# Patient Record
Sex: MALE | Race: WHITE
[De-identification: names, ages, dates, MRNs, and addresses within clinical notes are randomized per-mention and may not be internally consistent; named-entity substitution may affect disease eponyms.]

---

## 2017-07-24 ENCOUNTER — HOSPITAL ENCOUNTER (EMERGENCY)
Dept: HOSPITAL 45 - C.EDB | Age: 52
Discharge: HOME | End: 2017-07-24
Payer: COMMERCIAL

## 2017-07-24 VITALS
WEIGHT: 200.62 LBS | HEIGHT: 67.01 IN | HEIGHT: 67.01 IN | BODY MASS INDEX: 31.49 KG/M2 | WEIGHT: 200.62 LBS | BODY MASS INDEX: 31.49 KG/M2

## 2017-07-24 VITALS — SYSTOLIC BLOOD PRESSURE: 127 MMHG | HEART RATE: 70 BPM | DIASTOLIC BLOOD PRESSURE: 70 MMHG | OXYGEN SATURATION: 98 %

## 2017-07-24 VITALS — TEMPERATURE: 97.88 F

## 2017-07-24 DIAGNOSIS — M50.322: ICD-10-CM

## 2017-07-24 DIAGNOSIS — M50.323: ICD-10-CM

## 2017-07-24 DIAGNOSIS — S46.912A: Primary | ICD-10-CM
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
## 2017-07-24 LAB
CKMB/CK RATIO: 0.8 (ref 0–3)
INR PPP: 1 (ref 0.9–1.1)
PARTIAL THROMBOPLASTIN RATIO: 1
POINT OF CARE PRO-BNP: 22 PG/ML (ref 0–900)
POINT OF CARE TROPONIN I: < 0.03 NG/ML (ref 0–0.04)
PROTHROMBIN TIME: 10.9 SECONDS (ref 9–12)

## 2017-07-24 NOTE — EMERGENCY ROOM VISIT NOTE
History


First contact with patient:  08:31


Chief Complaint:  SHOULDER PAIN


Stated Complaint:  LEFT SHOULDER PAIN





History of Present Illness


The patient is a 52 year old male who presents to the Emergency Room with 

complaints of left shoulder pain which started 4 days ago.  The patient denies 

any falls.  The patient is a  and uses his arms unless he be on a 

daily basis.  The patient states that the pain radiates to the back of his arm 

and under her shoulder blade which is worse with movement.  He also gets some 

numbness in his index finger.  He took 2 Advil on Saturday without any relief.  

Last night he took an oxycodone from his brother to help him sleep.  The 

patient states today he cannot get comfortable.  The patient denies any chest 

pain or shortness of breath.  The patient does admit to tobacco use but denies 

any recent travel or any recent leg pain.





Review of Systems


10 system review was performed and was negative unless stated otherwise history 

of present illness.





Past Medical/Surgical History


No significant past medical history





Social History


Smoking Status:  Never Smoker


Smokeless Tobacco Use:  Yes


Alcohol Use:  occasionally


Drug Use:  none


Marital Status:  


Housing Status:  lives with family


Occupation Status:  employed





Current/Historical Medications


No Active Prescriptions or Reported Meds





Allergies


Uncoded Allergies:  


     NO KNOWN ALLERGIES (Allergy, Unknown, ., 7/24/17)





Physical Exam


Vital Signs











  Date Time  Temp Pulse Resp B/P (MAP) Pulse Ox O2 Delivery O2 Flow Rate FiO2


 


7/24/17 09:51  72 18 145/100 97   


 


7/24/17 08:14 36.6 77 18 154/100 97 Room Air  











Physical Exam


GENERAL: 52-year-old white male appears uncomfortable secondary to left 

shoulder pain.


MENTAL Status: Alert and oriented 3.


NECK: Supple, no lymphadenopathy noted.  No carotid bruits noted.


LUNGS: Clear auscultation without wheezes rales or rhonchi.


CARDIAC: Regular rate and rhythm without murmur.  Pulses is full and equal 

throughout.


ABDOMEN: Positive bowel sounds all 4 quadrants.  Soft, nontender to palpation 

without organomegaly or masses.


CERVICAL SPINE: No gross bony deformity noted.  The patient is nontender to 

palpation over the spinous processes.  He is some tenderness palpation in the 

left paravertebral region.


LEFT SHOULDER: No gross bony deformity noted.  The patient does not have any 

pain over the shoulder joint itself.  The patient has tenderness palpation over 

the upper trapezius region.  No erythema noted.  Remainder chest wall is 

nontender.   strength is 5 out of 5 as compared to the right.





Medical Decision & Procedures


ER Provider


Diagnostic Interpretation:


C-SPINE ROUTINE 4 OR 5 VIEWS





HISTORY:  52 years-old Male acute neck and shoulder pain without reported trauma





COMPARISON: None available





TECHNIQUE: 5 views of the cervical spine





FINDINGS: 


The seventh vertebral segment is partially secured on the lateral view secondary


to overlying shoulder. There is moderate intervertebral disc space narrowing at


C5-C6 with mild intervertebral disc space narrowing at C6-C7. Endplate spurring


is also most pronounced at these levels. Alignment is satisfactory without acute


fracture or dislocation. Mild bilateral foraminal narrowing is present at C5-C6.


Posterior elements also appear to be intact.





There is no prevertebral soft tissue swelling. Lung apices appear clear.





IMPRESSION: 


1. No acute fracture or dislocation of the cervical spine.


2. Moderate intervertebral disc space narrowing at C5-C6 with endplate spurring


causes mild bony foraminal narrowing bilaterally.


3. Mild intervertebral disc space narrowing at C6-C7. 











The above report was generated using voice recognition software. It may contain


grammatical, syntax or spelling errors.











Electronically signed by:  Celso Merritt M.D.


7/24/2017 10:59 AM





CHEST 2 VIEWS ROUTINE





CLINICAL HISTORY: 52 years-old Male presenting with left upper back pain. 





TECHNIQUE: PA and lateral views of the chest were obtained.





COMPARISON:  None.





FINDINGS:


Cardiomediastinal silhouette normal. Lungs and pleural spaces clear. Osseous


structures and upper abdomen normal.





IMPRESSION:


1.  No acute cardiopulmonary disease.











Electronically signed by:  Jim Hudson M.D.


7/24/2017 10:09 AM





Dictated Date/Time:  7/24/2017 10:08 AM





Laboratory Results











Test


  7/24/17


08:54 7/24/17


08:57


 


Prothrombin Time


  10.9 SECONDS


(9.0-12.0) 


 


 


Prothromb Time International


Ratio 1.0 (0.9-1.1) 


  


 


 


Activated Partial


Thromboplast Time 26.1 SECONDS


(21.0-31.0) 


 


 


Partial Thromboplastin Ratio 1.0  


 


Total Creatine Kinase


  93 U/L


() 


 


 


Creatine Kinase MB


  0.7 ng/ml


(0.5-3.6) 


 


 


Creatine Kinase MB Ratio 0.8 (0-3.0)  


 


Bedside D-Dimer


  


  247 ng/mlFEU


(0-450)


 


Bedside Troponin I


  


  < 0.030 ng/ml


(0-0.045)


 


NT-Pro-B-Type Natriuretic


Peptide 


  22 pg/ml


(0-900)











Medications Administered











 Medications


  (Trade)  Dose


 Ordered  Sig/Jaylin


 Route  Start Time


 Stop Time Status Last Admin


Dose Admin


 


 Ketorolac


 Tromethamine


  (Toradol Inj)  30 mg  NOW  STAT


 IV  7/24/17 08:47


 7/24/17 08:48 DC 7/24/17 08:59


30 MG


 


 Lorazepam


  (Ativan Inj)  1 mg  NOW  STAT


 IV  7/24/17 09:57


 7/24/17 09:58 DC 7/24/17 10:05


1 MG


 


 Acetaminophen/


 Hydrocodone Bitart


  (Norco 5/325 Tab)  2 tab  NOW  STAT


 PO  7/24/17 09:57


 7/24/17 09:58 DC 7/24/17 10:04


2 TAB











ED Course


The patient was evaluated.  The patient's EMR and medication list were 

reviewed.  The patient initially stated that he drove himself to the emergency 

room and could not get someone to pick him up.  IV access was obtained.  The 

patient was given Toradol 30 mg IV for pain.  Chest x-ray was ordered and 

interpreted by the radiologist and myself as above without any acute findings.  

Coags were normal.  The care d-dimer was within normal range.  Troponin was 

within normal range.  The patient later stated that he was able to get his 

daughter to come pick him up and therefore he could receive something for pain.

  The patient was given Ativan 1 mg IV for muscle relaxer and Norco 5/325 mg 2 

tablets by mouth for pain.  An x-ray of the cervical spine was ordered and 

interpreted by the radiologist as above with some disc space narrowing at the C5

-C6 level and C6-C7 level.  The patient was informed of the findings and was 

reevaluated.  He was feeling much better.  The patient was discharged home with 

his daughter driving.





Medical Decision


Differential diagnosis include pulmonary embolus, cervical radiculopathy, 

cervical fracture, spinal stenosis, muscular strain





Impression





 Primary Impression:  


 Strain of left trapezius muscle


 Additional Impression:  


 Degenerative arthritis of cervical spine





Departure Information


Dispostion


Home / Self-Care





Condition


GOOD





Prescriptions





Hydrocodone/Acetaminophen 5MG/325MG (Norco 5MG/325MG)  Tab


1-2 TABLET PO Q6 Y for Pain, #20 TAB


   For Initial Treatment


   Prov: Deborah, Vaishali M., PA-C         7/24/17 


Cyclobenzaprine Hcl (FLEXERIL) 10 Mg Tab


10 MG PO TID, #21 TAB


   Prov: Vaishali Cabrera PA-C         7/24/17





Referrals


No Doctor, Assigned (PCP)





Forms


HOME CARE DOCUMENTATION FORM,                                                 

               IMPORTANT VISIT INFORMATION





Patient Instructions


My [employer]





Additional Instructions





May try moist heat intermittently to the affected area.  Ibuprofen 600 mg every 

6 hours with food for pain.  Take Norco as needed for more severe pain.  Do not 

drive while taking the Norco.  Take Flexeril as directed.  Do not drive while 

taking the Flexeril.  Avoid any strenuous exercise which her upper body until 

symptoms are resolved.  If symptoms are not improving recommend follow-up with 

your family doctor for possible referral to neurology or spine surgeon.





Problem Qualifiers








 Primary Impression:  


 Strain of left trapezius muscle


 Encounter type:  initial encounter  Qualified Codes:  S46.812A - Strain of 

other muscles, fascia and tendons at shoulder and upper arm level, left arm, 

initial encounter


 Additional Impression:  


 Degenerative arthritis of cervical spine


 Spinal osteoarthritis complication:  with radiculopathy  Qualified Codes:  

M47.22 - Other spondylosis with radiculopathy, cervical region

## 2017-07-24 NOTE — DIAGNOSTIC IMAGING REPORT
CHEST 2 VIEWS ROUTINE



CLINICAL HISTORY: 52 years-old Male presenting with left upper back pain. 



TECHNIQUE: PA and lateral views of the chest were obtained.



COMPARISON:  None.



FINDINGS:

Cardiomediastinal silhouette normal. Lungs and pleural spaces clear. Osseous

structures and upper abdomen normal.



IMPRESSION:

1.  No acute cardiopulmonary disease.







Electronically signed by:  Jim Hudson M.D.

7/24/2017 10:09 AM



Dictated Date/Time:  7/24/2017 10:08 AM

## 2017-07-24 NOTE — DIAGNOSTIC IMAGING REPORT
C-SPINE ROUTINE 4 OR 5 VIEWS



HISTORY:  52 years-old Male acute neck and shoulder pain without reported trauma



COMPARISON: None available



TECHNIQUE: 5 views of the cervical spine



FINDINGS: 

The seventh vertebral segment is partially secured on the lateral view secondary

to overlying shoulder. There is moderate intervertebral disc space narrowing at

C5-C6 with mild intervertebral disc space narrowing at C6-C7. Endplate spurring

is also most pronounced at these levels. Alignment is satisfactory without acute

fracture or dislocation. Mild bilateral foraminal narrowing is present at C5-C6.

Posterior elements also appear to be intact.



There is no prevertebral soft tissue swelling. Lung apices appear clear.



IMPRESSION: 

1. No acute fracture or dislocation of the cervical spine.

2. Moderate intervertebral disc space narrowing at C5-C6 with endplate spurring

causes mild bony foraminal narrowing bilaterally.

3. Mild intervertebral disc space narrowing at C6-C7. 







The above report was generated using voice recognition software. It may contain

grammatical, syntax or spelling errors.







Electronically signed by:  Celso Merritt M.D.

7/24/2017 10:59 AM



Dictated Date/Time:  7/24/2017 10:56 AM

## 2018-04-14 ENCOUNTER — HOSPITAL ENCOUNTER (EMERGENCY)
Dept: HOSPITAL 45 - C.EDB | Age: 53
Discharge: HOME | End: 2018-04-14
Payer: COMMERCIAL

## 2018-04-14 VITALS — OXYGEN SATURATION: 98 % | DIASTOLIC BLOOD PRESSURE: 92 MMHG | SYSTOLIC BLOOD PRESSURE: 134 MMHG | HEART RATE: 68 BPM

## 2018-04-14 VITALS — TEMPERATURE: 98.6 F

## 2018-04-14 VITALS
WEIGHT: 192.24 LBS | WEIGHT: 192.24 LBS | HEIGHT: 67.01 IN | HEIGHT: 67.01 IN | BODY MASS INDEX: 30.17 KG/M2 | BODY MASS INDEX: 30.17 KG/M2

## 2018-04-14 DIAGNOSIS — R39.9: Primary | ICD-10-CM

## 2018-04-14 LAB
ALBUMIN SERPL-MCNC: 4.2 GM/DL (ref 3.4–5)
ALP SERPL-CCNC: 77 U/L (ref 45–117)
ALT SERPL-CCNC: 26 U/L (ref 12–78)
AST SERPL-CCNC: 15 U/L (ref 15–37)
BASOPHILS # BLD: 0.02 K/UL (ref 0–0.2)
BASOPHILS NFR BLD: 0.3 %
BUN SERPL-MCNC: 13 MG/DL (ref 7–18)
CALCIUM SERPL-MCNC: 9.1 MG/DL (ref 8.5–10.1)
CO2 SERPL-SCNC: 25 MMOL/L (ref 21–32)
CREAT SERPL-MCNC: 1.07 MG/DL (ref 0.6–1.4)
EOS ABS #: 0.05 K/UL (ref 0–0.5)
EOSINOPHIL NFR BLD AUTO: 165 K/UL (ref 130–400)
GLUCOSE SERPL-MCNC: 130 MG/DL (ref 70–99)
HCT VFR BLD CALC: 42 % (ref 42–52)
HGB BLD-MCNC: 15.7 G/DL (ref 14–18)
IG#: 0.02 K/UL (ref 0–0.02)
IMM GRANULOCYTES NFR BLD AUTO: 12.4 %
LYMPHOCYTES # BLD: 0.96 K/UL (ref 1.2–3.4)
MCH RBC QN AUTO: 31.5 PG (ref 25–34)
MCHC RBC AUTO-ENTMCNC: 37.4 G/DL (ref 32–36)
MCV RBC AUTO: 84.2 FL (ref 80–100)
MONO ABS #: 0.48 K/UL (ref 0.11–0.59)
MONOCYTES NFR BLD: 6.2 %
NEUT ABS #: 6.2 K/UL (ref 1.4–6.5)
NEUTROPHILS # BLD AUTO: 0.6 %
NEUTROPHILS NFR BLD AUTO: 80.2 %
PMV BLD AUTO: 8.9 FL (ref 7.4–10.4)
POTASSIUM SERPL-SCNC: 3.5 MMOL/L (ref 3.5–5.1)
PROT SERPL-MCNC: 7.5 GM/DL (ref 6.4–8.2)
RED CELL DISTRIBUTION WIDTH CV: 12.4 % (ref 11.5–14.5)
RED CELL DISTRIBUTION WIDTH SD: 37.7 FL (ref 36.4–46.3)
SODIUM SERPL-SCNC: 136 MMOL/L (ref 136–145)
WBC # BLD AUTO: 7.73 K/UL (ref 4.8–10.8)

## 2018-04-14 NOTE — DIAGNOSTIC IMAGING REPORT
CT ABD/PELVIS IV CONTRAST ONLY



CLINICAL HISTORY: Lower abdominal/pelvic pain.    



COMPARISON STUDY:  None.



TECHNIQUE: Following the IV administration of 94 mL of Optiray-320, CT scan of

the abdomen and pelvis was performed from the lung bases to the proximal femurs.

Images are reviewed in the axial, sagittal, and coronal planes. IV contrast was

administered without complication.  A dose lowering technique was utilized

adhering to the principles of ALARA.





CT DOSE: 461.61 mGy.cm



FINDINGS:



Lower chest: There is respiratory motion artifact. There is mild dependent

atelectatic change



Liver: The contrast-enhanced liver is normal in size, contour, and attenuation.

There is no intrahepatic biliary ductal dilatation. The hepatic veins and portal

veins are patent.



Gallbladder: Unremarkable.



Spleen: Normal in size and attenuation.



Pancreas: Unremarkable.



Adrenal glands: Unremarkable.



Kidneys: There is symmetric renal cortical enhancement. The kidneys are normal

in size without hydronephrosis.



Bowel: There are no transition zones indicate bowel obstruction. There is no

evidence of acute diverticulitis. The appendix appears normal.



Peritoneum: There is no intraperitoneal free air or abdominal ascites. There is

small fat-containing umbilical hernia.



Vasculature: The abdominal aorta is normal in course and caliber.



Adenopathy: None.



Pelvic viscera: There is borderline bladder wall thickening



Skeletal structures: No destructive osseous lesions are seen.



IMPRESSION:  



1. No evidence of bowel obstruction. No evidence of free air

2. No evidence of acute diverticulitis. No evidence of acute appendicitis.

3. No evidence of hydronephrosis

4. Borderline bladder wall thickening







Electronically signed by:  Tomas Hunter M.D.

4/14/2018 3:01 PM



Dictated Date/Time:  4/14/2018 2:56 PM

## 2018-04-14 NOTE — DIAGNOSTIC IMAGING REPORT
TESTICULAR ULTRASOUND



HISTORY:      Dysuria, intermittent testicular pain



COMPARISON:  None.



FINDINGS:



Right testis: 5.1 cm. There are no intratesticular masses. Normal color flow.

Trace hydrocele. The epididymis is unremarkable.



Left testis: 4.7 cm. There are no intratesticular masses. Normal color flow.

Trace hydrocele. The epididymis is unremarkable.







IMPRESSION:  

Trace bilateral hydroceles. Otherwise, normal testes. 







Electronically signed by:  Nahid Garner M.D.

4/14/2018 5:05 PM



Dictated Date/Time:  4/14/2018 5:04 PM

## 2018-04-14 NOTE — EMERGENCY ROOM VISIT NOTE
History


First contact with patient:  13:25


Chief Complaint:  URINARY SYMPTOMS


Stated Complaint:  PAIN IN BLADDER/ABD, ON ANITBIOTIC,PROSTATE INFECT


Nursing Triage Summary:  


Dx with prostatis a few weeks ago and placed on atb- Cipro.   Burning with 


urination.





History of Present Illness


The patient is a 53 year old male who presents to the Emergency Room with 

complaints of urinary symptoms and lower abdominal discomfort that has been 

going on for approximately the last 6 weeks.  The patient reports burning with 

urination.  He denies any blood in his urine.  He denies any flank pain.  No 

fever.  The patient saw his primary care physician twice.  He was treated once 

for UTI he went back to his primary care physician.  A rectal exam was 

performed.  He was told that he had prostatitis.  The patient was put on a four-

week course of antibiotics.  He temporarily got better with a 2 week course of 

antibiotics..  His symptoms returned about 1 week after finishing the 

antibiotics.  He is extremely nervous about what is going on.  He denies any 

nausea, but his appetite has not been up to par.  He also reports some changes 

in bowel habits.  He feels like he has to go, and cannot have a significant 

bowel movement.  He denies any blood in his stool.  No history of 

diverticulitis.





Review of Systems


10 system review performed and  negative unless noted in HPI or below





Past Medical/Surgical History


GERD





Social History


Smoking Status:  Never Smoker


Alcohol Use:  occasionally


Drug Use:  none


Marital Status:  


Housing Status:  lives with family


Occupation Status:  employed





Current/Historical Medications


Scheduled


Ciprofloxacin Tab (Cipro), 250 MG PO BID


Phenazopyridine HCl (Pyridium), 200 MG PO TID





Physical Exam


Vital Signs











  Date Time  Temp Pulse Resp B/P (MAP) Pulse Ox O2 Delivery O2 Flow Rate FiO2


 


4/14/18 17:46  68 18 134/92 98 Room Air  


 


4/14/18 16:47  60 18 121/67 98 Room Air  


 


4/14/18 14:42  69 17 131/83 96 Room Air  


 


4/14/18 13:21 37.0 88 20 140/91 95 Room Air  











Physical Exam


VITALS: Vitals are noted on the nurse's note and reviewed by myself.  Vital 

signs stable.


GENERAL: 53-year-old male, mildly anxious in appearance,,


SKIN: The skin was without rashes, erythema, edema, or bruising.  There is no 

tenting of the skin.  Capillary reflex less than 2 seconds.


HEAD: Normocephalic atraumatic.  





MOUTH: Mucous membranes moist. 


NECK: Supple without nuchal rigidity.  No lymphadenopathy. Cervical spine is 

nontender.  No JVD.


HEART: Regular rate and rhythm without murmurs gallops or rubs.


LUNGS: Clear to auscultation bilaterally without wheezes, rales or rhonchi.   

No accessory muscle use.


ABDOMEN: Positive bowel sounds x 4.Soft, tenderness to palpation in the 

suprapubic region, without organomegaly. No guarding or rebound tenderness.  No 

CVA tenderness bilaterally


MUSCULOSKELETAL: No muscle atrophy, erythema, or edema noted.  Strength 5/5 

throughout.


NEURO: Patient was alert and oriented to person place and time.  Normal 

sensation to touch. No focal neurological deficits.





Medical Decision & Procedures


ER Provider


Diagnostic Interpretation:


Scrotal US


IMPRESSION:  


Trace bilateral hydroceles. Otherwise, normal testes. 











Electronically signed by:  Nahid Garner M.D.


4/14/2018 5:05 PM





Dictated Date/Time:  4/14/2018 5:04 PM








CT abdomen and pelvisIMPRESSION:  





1. No evidence of bowel obstruction. No evidence of free air


2. No evidence of acute diverticulitis. No evidence of acute appendicitis.


3. No evidence of hydronephrosis


4. Borderline bladder wall thickening











Electronically signed by:  Tomas Hunter M.D.


4/14/2018 3:01 PM





Dictated Date/Time:  4/14/2018 2:56 PM





Laboratory Results


4/14/18 14:00








Red Blood Count 4.99, Mean Corpuscular Volume 84.2, Mean Corpuscular Hemoglobin 

31.5, Mean Corpuscular Hemoglobin Concent 37.4, Mean Platelet Volume 8.9, 

Neutrophils (%) (Auto) 80.2, Lymphocytes (%) (Auto) 12.4, Monocytes (%) (Auto) 

6.2, Eosinophils (%) (Auto) 0.6, Basophils (%) (Auto) 0.3, Neutrophils # (Auto) 

6.20, Lymphocytes # (Auto) 0.96, Monocytes # (Auto) 0.48, Eosinophils # (Auto) 

0.05, Basophils # (Auto) 0.02





4/14/18 14:00

















Test


  4/14/18


13:50 4/14/18


14:00


 


Urine Color YELLOW  


 


Urine Appearance CLEAR (CLEAR)  


 


Urine pH 5.5 (4.5-7.5)  


 


Urine Specific Gravity


  1.013


(1.000-1.030) 


 


 


Urine Protein NEG (NEG)  


 


Urine Glucose (UA) NEG (NEG)  


 


Urine Ketones 1+ (NEG)  


 


Urine Occult Blood NEG (NEG)  


 


Urine Nitrite NEG (NEG)  


 


Urine Bilirubin NEG (NEG)  


 


Urine Urobilinogen NEG (NEG)  


 


Urine Leukocyte Esterase NEG (NEG)  


 


White Blood Count


  


  7.73 K/uL


(4.8-10.8)


 


Red Blood Count


  


  4.99 M/uL


(4.7-6.1)


 


Hemoglobin


  


  15.7 g/dL


(14.0-18.0)


 


Hematocrit  42.0 % (42-52) 


 


Mean Corpuscular Volume


  


  84.2 fL


()


 


Mean Corpuscular Hemoglobin


  


  31.5 pg


(25-34)


 


Mean Corpuscular Hemoglobin


Concent 


  37.4 g/dl


(32-36)


 


Platelet Count


  


  165 K/uL


(130-400)


 


Mean Platelet Volume


  


  8.9 fL


(7.4-10.4)


 


Neutrophils (%) (Auto)  80.2 % 


 


Lymphocytes (%) (Auto)  12.4 % 


 


Monocytes (%) (Auto)  6.2 % 


 


Eosinophils (%) (Auto)  0.6 % 


 


Basophils (%) (Auto)  0.3 % 


 


Neutrophils # (Auto)


  


  6.20 K/uL


(1.4-6.5)


 


Lymphocytes # (Auto)


  


  0.96 K/uL


(1.2-3.4)


 


Monocytes # (Auto)


  


  0.48 K/uL


(0.11-0.59)


 


Eosinophils # (Auto)


  


  0.05 K/uL


(0-0.5)


 


Basophils # (Auto)


  


  0.02 K/uL


(0-0.2)


 


RDW Standard Deviation


  


  37.7 fL


(36.4-46.3)


 


RDW Coefficient of Variation


  


  12.4 %


(11.5-14.5)


 


Immature Granulocyte % (Auto)  0.3 % 


 


Immature Granulocyte # (Auto)


  


  0.02 K/uL


(0.00-0.02)


 


Anion Gap


  


  5.0 mmol/L


(3-11)


 


Est Creatinine Clear Calc


Drug Dose 


  84.2 ml/min 


 


 


Estimated GFR (


American) 


  91.4 


 


 


Estimated GFR (Non-


American 


  78.8 


 


 


BUN/Creatinine Ratio  12.0 (10-20) 


 


Calcium Level


  


  9.1 mg/dl


(8.5-10.1)


 


Total Bilirubin


  


  0.7 mg/dl


(0.2-1)


 


Aspartate Amino Transf


(AST/SGOT) 


  15 U/L (15-37) 


 


 


Alanine Aminotransferase


(ALT/SGPT) 


  26 U/L (12-78) 


 


 


Alkaline Phosphatase


  


  77 U/L


()


 


Total Protein


  


  7.5 gm/dl


(6.4-8.2)


 


Albumin


  


  4.2 gm/dl


(3.4-5.0)


 


Globulin


  


  3.3 gm/dl


(2.5-4.0)


 


Albumin/Globulin Ratio  1.3 (0.9-2) 











Medications Administered











 Medications


  (Trade)  Dose


 Ordered  Sig/Jaylin


 Route  Start Time


 Stop Time Status Last Admin


Dose Admin


 


 Sodium Chloride  1,000 ml @ 


 999 mls/hr  Q1H1M ONCE


 IV  4/14/18 13:45


 4/14/18 14:45 DC 4/14/18 14:01


999 MLS/HR


 


 Phenazopyridine


 HCl


  (Pyridium Tab)  200 mg  NOW  STAT


 PO  4/14/18 15:38


 4/14/18 15:39 DC 4/14/18 15:46


200 MG











ED Course


Patient was seen and examined


Vital signs including  blood pressure were reviewed


medications list was verified with patient


Labs were obtained, and a saline lock was established


The patient declined pain and anxiety medication.  He was hydrated with 1 L of 

normal saline 


Imaging was performed and reviewed


The case was discussed with my supervising physician


The patient was given 1 dose of Pyridium


Upon reevaluation, his urinary symptoms were slightly improved.  We thoroughly 

discussed his workup.  He voiced understanding, was comfortable being 

discharged home.


I reviewed discharge instructions the patient.  They voiced understanding and 

had no further questions.





Medical Decision


Differential diagnosis: UTI, prostatitis, orchitis, ureteral stone, 

pyelonephritis, malignancy, diverticulitis, interstitial cystitis





This patient is a pleasant 53-year-old male presents to the emergency 

department with lower abdominal pain and urinary symptoms for several weeks.  

He is currently being treated with antibiotics.  He was nontoxic in appearance.

  He had mild tenderness in the suprapubic region.  Workup reveals no 

leukocytosis.  His urinalysis appears clean.  Renal function intact.  A CT scan 

was unremarkable.  There are no signs of malignancy.  Ultrasound was also 

unremarkable.  The etiology of his symptoms are unclear.  The patient had some 

symptomatic relief with Pyridium in the emergency department.  He was 

encouraged to follow-up as soon as possible with a urologist.  He was 

comfortable with this plan.  He will continue his antibiotics.  He agrees to 

return with any new or worsening symptoms.





This chart was completed in part utilizing Dragon Speech Voice Recognition 

software. Attempts were made to minimize the grammatical errors, random word 

insertions, pronoun errors and incomplete sentences. Any formal questions or 

concerns about the content, text or information contained within the body of 

this dictation should be directly addressed to the provider for clarification.





Medication Reconcilliation


Current Medication List:  was personally reviewed by me





Blood Pressure Screening


Patient's blood pressure:  Elevated blood pressure


Blood pressure disposition:  Elevated BP felt to be situational





Impression





 Primary Impression:  


 Symptoms involving urinary system





Departure Information


Prescriptions





Phenazopyridine HCl (Pyridium) 200 Mg Tab


200 MG PO TID, #20 TAB


   Prov: Maria Esther Curiel PA-C         4/14/18





Referrals


Balta Ramires III, M.D. (PCP)





Patient Instructions


My [facility]